# Patient Record
Sex: FEMALE | Race: WHITE | NOT HISPANIC OR LATINO | Employment: FULL TIME | URBAN - METROPOLITAN AREA
[De-identification: names, ages, dates, MRNs, and addresses within clinical notes are randomized per-mention and may not be internally consistent; named-entity substitution may affect disease eponyms.]

---

## 2017-01-18 ENCOUNTER — ALLSCRIPTS OFFICE VISIT (OUTPATIENT)
Dept: OTHER | Facility: OTHER | Age: 32
End: 2017-01-18

## 2017-03-22 ENCOUNTER — ALLSCRIPTS OFFICE VISIT (OUTPATIENT)
Dept: OTHER | Facility: OTHER | Age: 32
End: 2017-03-22

## 2018-01-13 VITALS
HEART RATE: 80 BPM | HEIGHT: 66 IN | DIASTOLIC BLOOD PRESSURE: 70 MMHG | WEIGHT: 197.45 LBS | TEMPERATURE: 98.4 F | SYSTOLIC BLOOD PRESSURE: 128 MMHG | BODY MASS INDEX: 31.73 KG/M2

## 2018-01-14 VITALS
HEIGHT: 66 IN | TEMPERATURE: 98.5 F | SYSTOLIC BLOOD PRESSURE: 124 MMHG | DIASTOLIC BLOOD PRESSURE: 76 MMHG | BODY MASS INDEX: 28.32 KG/M2 | WEIGHT: 176.2 LBS | HEART RATE: 82 BPM

## 2018-01-16 NOTE — RESULT NOTES
Verified Results  (LC) EBV Ab VCA, IgG [id number] 11:47AM Erick Griffins     Test Name Result Flag Reference   EBV Ab VCA, IgG >600 0 U/mL H 0 0-17 9   Negative        <18 0                                                  Equivocal 18 0 - 21 9                                                  Positive        >21 9     (LC) EBV Ab VCA, IgM [id number] 11:47AM Erick Griffins     Test Name Result Flag Reference   EBV Ab VCA, IgM <36 0 U/mL  0 0-35 9   Negative        <36 0                                                  Equivocal 36 0 - 43 9                                                  Positive        >43 9       Discussion/Summary    Mono test indicates she had the disease sometime in the past  How is she doing on the antibiotic? Please give me an update    Patient aware1       1 Amended By: Ayanna Glynn; May 20 2016 12:29 PM EST

## 2018-01-18 NOTE — RESULT NOTES
Verified Results  (1) CBC/PLT/DIFF [id number] 11:47AM Krystle Rothman     Test Name Result Flag Reference   WBC 13 2 x10E3/uL H 3 4-10 8   RBC 4 05 x10E6/uL  3 77-5 28   Hemoglobin 12 8 g/dL  11 1-15 9   Hematocrit 38 2 %  34 0-46  6   MCV 94 fL  79-97   MCH 31 6 pg  26 6-33 0   MCHC 33 5 g/dL  31 5-35 7   RDW 14 1 %  12 3-15 4   Platelets 469 N48T8/EO  150-379   Neutrophils 57 %     Lymphs 32 %     Monocytes 9 %     Eos 0 %     Basos 1 %     Neutrophils (Absolute) 7 6 x10E3/uL H 1 4-7 0   Lymphs (Absolute) 4 2 x10E3/uL H 0 7-3 1   Monocytes(Absolute) 1 1 x10E3/uL H 0 1-0 9   Eos (Absolute) 0 0 x10E3/uL  0 0-0 4   Baso (Absolute) 0 1 x10E3/uL  0 0-0 2   Immature Granulocytes 1 %     Immature Grans (Abs) 0 1 x10E3/uL  0 0-0 1       Plan  Acute tonsillitis    · Azithromycin 250 MG Oral Tablet; Take two tablets today, then one daily until  done    Discussion/Summary   Mono test is still pending but her white blood cell count is elevated and I do think an Rx Azithromycin would help  eRx done  We will let her know about mono when result comes in

## 2022-05-23 ENCOUNTER — OFFICE VISIT (OUTPATIENT)
Dept: FAMILY MEDICINE CLINIC | Facility: HOSPITAL | Age: 37
End: 2022-05-23

## 2022-05-23 VITALS
BODY MASS INDEX: 25.95 KG/M2 | SYSTOLIC BLOOD PRESSURE: 132 MMHG | HEART RATE: 66 BPM | DIASTOLIC BLOOD PRESSURE: 78 MMHG | TEMPERATURE: 98.1 F | WEIGHT: 160.8 LBS

## 2022-05-23 DIAGNOSIS — Z32.01 POSITIVE URINE PREGNANCY TEST: ICD-10-CM

## 2022-05-23 DIAGNOSIS — N91.2 AMENORRHEA: Primary | ICD-10-CM

## 2022-05-23 PROCEDURE — 99203 OFFICE O/P NEW LOW 30 MIN: CPT | Performed by: NURSE PRACTITIONER

## 2022-05-23 NOTE — PROGRESS NOTES
Assessment/Plan:      With positive urine pregnancy test  Check HCG to confirm  Quantitative  Should eval possible miscarriage with new onset vaginal bleeding after positive preg test       Diagnoses and all orders for this visit:    Amenorrhea  -     hCG, quantitative; Future  -     hCG, quantitative    Positive urine pregnancy test          Subjective:     Patient ID: Johana Mcneill is a 43 y o  female  Here to re-establish care  [facility]  [facility] who recommended Hcg testing through PCP  Currently has yeast infection which she gets frequently with hormonal changes  Over the last week has had light bleeding  Had intercourse last night bleeding became heavier  LMP was 2/15/22  Had IUD out in January  Periods were irregular  Review of Systems   Genitourinary: Positive for menstrual problem, vaginal bleeding and vaginal discharge  The following portions of the patient's history were reviewed and updated as appropriate: allergies, current medications, past family history, past medical history, past social history, past surgical history and problem list     Objective:  Vitals:    05/23/22 1512   BP: 132/78   Pulse: 66   Temp: 98 1 °F (36 7 °C)      Physical Exam  Vitals reviewed  Constitutional:       Appearance: Normal appearance  Cardiovascular:      Rate and Rhythm: Normal rate and regular rhythm  Heart sounds: Normal heart sounds  No murmur heard  Pulmonary:      Effort: Pulmonary effort is normal       Breath sounds: Normal breath sounds  Skin:     General: Skin is warm and dry  Neurological:      Mental Status: She is alert and oriented to person, place, and time  Psychiatric:         Mood and Affect: Mood normal          Behavior: Behavior normal          Thought Content:  Thought content normal          Judgment: Judgment normal

## 2022-05-28 LAB — HCG INTACT+B SERPL-ACNC: 2323 MIU/ML

## 2022-05-30 ENCOUNTER — HOSPITAL ENCOUNTER (EMERGENCY)
Facility: HOSPITAL | Age: 37
Discharge: HOME | End: 2022-05-31
Attending: EMERGENCY MEDICINE

## 2022-05-30 ENCOUNTER — NURSE TRIAGE (OUTPATIENT)
Dept: OTHER | Facility: OTHER | Age: 37
End: 2022-05-30

## 2022-05-30 DIAGNOSIS — O20.0 THREATENED MISCARRIAGE: Primary | ICD-10-CM

## 2022-05-30 LAB
ABO GROUP BLD: NORMAL
ABO GROUP BLD: NORMAL
ANION GAP SERPL CALCULATED.3IONS-SCNC: 6 MMOL/L (ref 4–13)
B-HCG SERPL-ACNC: 1077 MIU/ML
BACTERIA UR QL AUTO: ABNORMAL /HPF
BASOPHILS # BLD AUTO: 0.08 THOUSANDS/ΜL (ref 0–0.1)
BASOPHILS NFR BLD AUTO: 1 % (ref 0–1)
BILIRUB UR QL STRIP: NEGATIVE
BLD GP AB SCN SERPL QL: NEGATIVE
BUN SERPL-MCNC: 12 MG/DL (ref 5–25)
CALCIUM SERPL-MCNC: 8.3 MG/DL (ref 8.3–10.1)
CHLORIDE SERPL-SCNC: 103 MMOL/L (ref 100–108)
CLARITY UR: CLEAR
CO2 SERPL-SCNC: 29 MMOL/L (ref 21–32)
COLOR UR: YELLOW
CREAT SERPL-MCNC: 0.55 MG/DL (ref 0.6–1.3)
EOSINOPHIL # BLD AUTO: 0.21 THOUSAND/ΜL (ref 0–0.61)
EOSINOPHIL NFR BLD AUTO: 3 % (ref 0–6)
ERYTHROCYTE [DISTWIDTH] IN BLOOD BY AUTOMATED COUNT: 11.9 % (ref 11.6–15.1)
GFR SERPL CREATININE-BSD FRML MDRD: 119 ML/MIN/1.73SQ M
GLUCOSE SERPL-MCNC: 91 MG/DL (ref 65–140)
GLUCOSE UR STRIP-MCNC: NEGATIVE MG/DL
HCT VFR BLD AUTO: 33.9 % (ref 34.8–46.1)
HGB BLD-MCNC: 11.4 G/DL (ref 11.5–15.4)
HGB UR QL STRIP.AUTO: ABNORMAL
IMM GRANULOCYTES # BLD AUTO: 0.05 THOUSAND/UL (ref 0–0.2)
IMM GRANULOCYTES NFR BLD AUTO: 1 % (ref 0–2)
KETONES UR STRIP-MCNC: NEGATIVE MG/DL
LEUKOCYTE ESTERASE UR QL STRIP: NEGATIVE
LYMPHOCYTES # BLD AUTO: 2.63 THOUSANDS/ΜL (ref 0.6–4.47)
LYMPHOCYTES NFR BLD AUTO: 33 % (ref 14–44)
MCH RBC QN AUTO: 32.6 PG (ref 26.8–34.3)
MCHC RBC AUTO-ENTMCNC: 33.6 G/DL (ref 31.4–37.4)
MCV RBC AUTO: 97 FL (ref 82–98)
MONOCYTES # BLD AUTO: 0.65 THOUSAND/ΜL (ref 0.17–1.22)
MONOCYTES NFR BLD AUTO: 8 % (ref 4–12)
NEUTROPHILS # BLD AUTO: 4.3 THOUSANDS/ΜL (ref 1.85–7.62)
NEUTS SEG NFR BLD AUTO: 54 % (ref 43–75)
NITRITE UR QL STRIP: NEGATIVE
NON-SQ EPI CELLS URNS QL MICRO: ABNORMAL /HPF
NRBC BLD AUTO-RTO: 0 /100 WBCS
PH UR STRIP.AUTO: 5.5 [PH] (ref 4.5–8)
PLATELET # BLD AUTO: 262 THOUSANDS/UL (ref 149–390)
PMV BLD AUTO: 9.3 FL (ref 8.9–12.7)
POTASSIUM SERPL-SCNC: 3.9 MMOL/L (ref 3.5–5.3)
PROT UR STRIP-MCNC: ABNORMAL MG/DL
RBC # BLD AUTO: 3.5 MILLION/UL (ref 3.81–5.12)
RBC #/AREA URNS AUTO: ABNORMAL /HPF
RH BLD: POSITIVE
RH BLD: POSITIVE
SODIUM SERPL-SCNC: 138 MMOL/L (ref 136–145)
SP GR UR STRIP.AUTO: 1.02 (ref 1–1.03)
SPECIMEN EXPIRATION DATE: NORMAL
UROBILINOGEN UR QL STRIP.AUTO: 0.2 E.U./DL
WBC # BLD AUTO: 7.92 THOUSAND/UL (ref 4.31–10.16)
WBC #/AREA URNS AUTO: ABNORMAL /HPF

## 2022-05-30 PROCEDURE — 86850 RBC ANTIBODY SCREEN: CPT | Performed by: EMERGENCY MEDICINE

## 2022-05-30 PROCEDURE — 81001 URINALYSIS AUTO W/SCOPE: CPT

## 2022-05-30 PROCEDURE — 84702 CHORIONIC GONADOTROPIN TEST: CPT | Performed by: EMERGENCY MEDICINE

## 2022-05-30 PROCEDURE — 99284 EMERGENCY DEPT VISIT MOD MDM: CPT

## 2022-05-30 PROCEDURE — 85025 COMPLETE CBC W/AUTO DIFF WBC: CPT | Performed by: EMERGENCY MEDICINE

## 2022-05-30 PROCEDURE — 87086 URINE CULTURE/COLONY COUNT: CPT

## 2022-05-30 PROCEDURE — 86901 BLOOD TYPING SEROLOGIC RH(D): CPT | Performed by: EMERGENCY MEDICINE

## 2022-05-30 PROCEDURE — 86900 BLOOD TYPING SEROLOGIC ABO: CPT | Performed by: EMERGENCY MEDICINE

## 2022-05-30 PROCEDURE — 80048 BASIC METABOLIC PNL TOTAL CA: CPT | Performed by: EMERGENCY MEDICINE

## 2022-05-30 PROCEDURE — 99284 EMERGENCY DEPT VISIT MOD MDM: CPT | Performed by: EMERGENCY MEDICINE

## 2022-05-30 PROCEDURE — 36415 COLL VENOUS BLD VENIPUNCTURE: CPT | Performed by: EMERGENCY MEDICINE

## 2022-05-31 ENCOUNTER — DOCUMENTATION (OUTPATIENT)
Dept: EMERGENCY DEPT | Facility: HOSPITAL | Age: 37
End: 2022-05-31

## 2022-05-31 ENCOUNTER — APPOINTMENT (EMERGENCY)
Dept: ULTRASOUND IMAGING | Facility: HOSPITAL | Age: 37
End: 2022-05-31

## 2022-05-31 VITALS
HEART RATE: 74 BPM | DIASTOLIC BLOOD PRESSURE: 89 MMHG | SYSTOLIC BLOOD PRESSURE: 141 MMHG | TEMPERATURE: 97.8 F | OXYGEN SATURATION: 100 % | RESPIRATION RATE: 16 BRPM

## 2022-05-31 PROCEDURE — 76815 OB US LIMITED FETUS(S): CPT

## 2022-05-31 NOTE — DISCHARGE INSTRUCTIONS
Get repeat blood work in 48 hours  Follow up with your OB in the morning  Return to ER if pain worsens, or heavy vaginal bleeding

## 2022-05-31 NOTE — ED PROVIDER NOTES
History  Chief Complaint   Patient presents with    Threatened Miscarriage     Pt states she thinks she had a miscarriage  Pt states she thinks she is 4 to six weeks  Patient with pmh  complains of probable miscarriage this afternoon - she had stopped birth control 5 months ago and was trying to get pregnant  She had been having irregular periods  She checked a pregnancy test May 5 and it was positive  She had arranged to follow-up with [place] [facility]  Today, just resting at home and had sudden gush of blood and then a sac  She felt mild discomfort  Bleeding has decreased since then  None       History reviewed  No pertinent past medical history  Past Surgical History:   Procedure Laterality Date    SEPTOPLASTY         Family History   Problem Relation Age of Onset    No Known Problems Mother     Coronary artery disease Father     No Known Problems Sister     No Known Problems Brother     Breast cancer Maternal Grandmother     Coronary artery disease Maternal Grandmother      I have reviewed and agree with the history as documented  E-Cigarette/Vaping    E-Cigarette Use Never User      E-Cigarette/Vaping Substances     Social History     Tobacco Use    Smoking status: Never Smoker    Smokeless tobacco: Never Used   Vaping Use    Vaping Use: Never used   Substance Use Topics    Alcohol use: Not Currently    Drug use: Never       Review of Systems   Constitutional: Negative for chills and fever  HENT: Negative for rhinorrhea and sore throat  Respiratory: Negative for shortness of breath  Cardiovascular: Negative for chest pain  Gastrointestinal: Negative for constipation, diarrhea, nausea and vomiting  Genitourinary: Positive for pelvic pain and vaginal bleeding  Negative for dysuria and frequency  Skin: Negative for rash  All other systems reviewed and are negative  Physical Exam  Physical Exam  Vitals and nursing note reviewed   Exam conducted with a chaperone present  Constitutional:       Appearance: She is well-developed  HENT:      Head: Normocephalic and atraumatic  Right Ear: External ear normal       Left Ear: External ear normal       Nose: Nose normal    Eyes:      Conjunctiva/sclera: Conjunctivae normal       Pupils: Pupils are equal, round, and reactive to light  Cardiovascular:      Rate and Rhythm: Normal rate and regular rhythm  Heart sounds: Normal heart sounds  Pulmonary:      Effort: Pulmonary effort is normal  No respiratory distress  Breath sounds: Normal breath sounds  No wheezing  Abdominal:      General: Bowel sounds are normal  There is no distension  Palpations: Abdomen is soft  Tenderness: There is no abdominal tenderness  Genitourinary:     Exam position: Supine  Pubic Area: No rash  Labia:         Right: No rash  Vagina: Normal       Cervix: Dilated  Cervical bleeding present  No cervical motion tenderness or discharge  Uterus: Normal        Adnexa: Right adnexa normal and left adnexa normal         Right: No tenderness  Left: No tenderness  Musculoskeletal:         General: No deformity  Normal range of motion  Cervical back: Normal range of motion and neck supple  No spinous process tenderness  Skin:     General: Skin is warm and dry  Findings: No rash  Neurological:      General: No focal deficit present  Mental Status: She is alert  GCS: GCS eye subscore is 4  GCS verbal subscore is 5  GCS motor subscore is 6  Sensory: No sensory deficit     Psychiatric:         Mood and Affect: Mood normal          Vital Signs  ED Triage Vitals [05/30/22 2107]   Temperature Pulse Respirations Blood Pressure SpO2   97 8 °F (36 6 °C) 79 18 132/86 97 %      Temp Source Heart Rate Source Patient Position - Orthostatic VS BP Location FiO2 (%)   Temporal Monitor Lying Right arm --      Pain Score       --           Vitals:    05/30/22 2107 05/31/22 0000 05/31/22 0125 05/31/22 0200   BP: 132/86 137/77 136/83 141/89   Pulse: 79 78 68 74   Patient Position - Orthostatic VS: Lying            Visual Acuity      ED Medications  Medications - No data to display    Diagnostic Studies  Results Reviewed     Procedure Component Value Units Date/Time    Urine culture [[id number]] Collected: 05/30/22 2200    Lab Status: Final result Specimen: Urine, Clean Catch Updated: 06/01/22 0710     Urine Culture No Growth <1000 cfu/mL    hCG, quantitative [[id number]]  (Abnormal) Collected: 05/30/22 2156    Lab Status: Final result Specimen: Blood from Arm, Left Updated: 05/30/22 2258     HCG, Quant 1,077 mIU/mL     Narrative:       Expected Ranges:     Approximate               Approximate HCG  Gestation age          Concentration ( mIU/mL)  _____________          ______________________   Aleida John                      HCG values  0 2-1                       5-50  1-2                           2-3                         100-5000  3-4                         500-67719  4-5                         1000-97558  5-6                         [id number]  6-8                         80193-276350  8-12                        15302-793631      Urine Microscopic [[id number]]  (Abnormal) Collected: 05/30/22 2200    Lab Status: Final result Specimen: Urine, Clean Catch Updated: 05/30/22 2253     RBC, UA Innumerable /hpf      WBC, UA None Seen /hpf      Epithelial Cells Occasional /hpf      Bacteria, UA None Seen /hpf     Basic metabolic panel [[id number]]  (Abnormal) Collected: 05/30/22 2156    Lab Status: Final result Specimen: Blood from Arm, Left Updated: 05/30/22 2238     Sodium 138 mmol/L      Potassium 3 9 mmol/L      Chloride 103 mmol/L      CO2 29 mmol/L      ANION GAP 6 mmol/L      BUN 12 mg/dL      Creatinine 0 55 mg/dL      Glucose 91 mg/dL      Calcium 8 3 mg/dL      eGFR 119 ml/min/1 73sq m     Narrative:      [place] guidelines for Chronic Kidney Disease (CKD):   Stage 1 with normal or high GFR (GFR > 90 mL/min/1 73 square meters)    Stage 2 Mild CKD (GFR = 60-89 mL/min/1 73 square meters)    Stage 3A Moderate CKD (GFR = 45-59 mL/min/1 73 square meters)    Stage 3B Moderate CKD (GFR = 30-44 mL/min/1 73 square meters)    Stage 4 Severe CKD (GFR = 15-29 mL/min/1 73 square meters)    Stage 5 End Stage CKD (GFR <15 mL/min/1 73 square meters)  Note: GFR calculation is accurate only with a steady state creatinine    CBC and differential [[id number]]  (Abnormal) Collected: 05/30/22 2156    Lab Status: Final result Specimen: Blood from Arm, Left Updated: 05/30/22 2203     WBC 7 92 Thousand/uL      RBC 3 50 Million/uL      Hemoglobin 11 4 g/dL      Hematocrit 33 9 %      MCV 97 fL      MCH 32 6 pg      MCHC 33 6 g/dL      RDW 11 9 %      MPV 9 3 fL      Platelets 874 Thousands/uL      nRBC 0 /100 WBCs      Neutrophils Relative 54 %      Immat GRANS % 1 %      Lymphocytes Relative 33 %      Monocytes Relative 8 %      Eosinophils Relative 3 %      Basophils Relative 1 %      Neutrophils Absolute 4 30 Thousands/µL      Immature Grans Absolute 0 05 Thousand/uL      Lymphocytes Absolute 2 63 Thousands/µL      Monocytes Absolute 0 65 Thousand/µL      Eosinophils Absolute 0 21 Thousand/µL      Basophils Absolute 0 08 Thousands/µL     Urine Macroscopic, POC [[id number]]  (Abnormal) Collected: 05/30/22 2200    Lab Status: Final result Specimen: Urine Updated: 05/30/22 2202     Color, UA Yellow     Clarity, UA Clear     pH, UA 5 5     Leukocytes, UA Negative     Nitrite, UA Negative     Protein, UA 30 (1+) mg/dl      Glucose, UA Negative mg/dl      Ketones, UA Negative mg/dl      Urobilinogen, UA 0 2 E U /dl      Bilirubin, UA Negative     Blood, UA Large     Specific Gravity, UA 1 025                 US OB pregnancy limited with transvaginal   Final Result by Darline Chong DO (05/31 0147)   No intrauterine gestation or adnexal mass identified     Differential remains early IUP, spontaneous  and ectopic pregnancy  Correlate with serial quantitative BHCG  Left-sided corpus luteal cyst      Workstation performed: [id number]                    Procedures  POC Pelvic US    Date/Time: 2022 11:19 PM  Performed by: Jana Green DO  Authorized by: Jana Green DO     Patient location:  ED  Procedure details:     Exam Type:  Diagnostic    Indications: evaluate for IUP and pregnant with vaginal bleeding      Assessment for: confirm intrauterine pregnancy and evaluate fetal viability      Technique:  Transabdominal obstetric (HCG+) exam    Views obtained: uterus (transverse and sagittal)      Image quality: non-diagnostic      Image availability:  Not saved  Uterine findings:     Endometrial stripe: not identified      Intrauterine pregnancy: not identified      Gestational sac: not identified      Fetal heart rate: not identified    Other findings:     Free pelvic fluid: not identified    Interpretation:     Ultrasound impressions: indeterminate      Pregnancy findings: indeterminate               ED Course  ED Course as of 22e May 31, 2022   0013 Sign out to [employer] - probable miscarriage earlier today, ultrasound to rule out ectopic, follow-up with [place] [facility]                               SBIRT 22yo+    Flowsheet Row Most Recent Value   SBIRT (23 yo +)    In order to provide better care to our patients, we are screening all of our patients for alcohol and drug use  Would it be okay to ask you these screening questions? Yes Filed at: 2022   Initial Alcohol Screen: US AUDIT-C     1  How often do you have a drink containing alcohol? 0 Filed at: 2022   2  How many drinks containing alcohol do you have on a typical day you are drinking? 0 Filed at: 2022   3a  Male UNDER 65: How often do you have five or more drinks on one occasion? 0 Filed at: 2022   3b  FEMALE Any Age, or MALE 65+:  How often do you have 4 or more drinks on one occassion? 0 Filed at: 05/30/2022 2124   Audit-C Score 0 Filed at: 05/30/2022 2124   DAVID: How many times in the past year have you    Used an illegal drug or used a prescription medication for non-medical reasons? Never Filed at: 05/30/2022 2124                    [facility]  Number of Diagnoses or Management Options  Threatened miscarriage: new and requires workup     Amount and/or Complexity of Data Reviewed  Clinical lab tests: ordered and reviewed  Tests in the radiology section of CPT®: ordered and reviewed  Discuss the patient with other providers: yes    Patient Progress  Patient progress: improved      Disposition  Final diagnoses:   Threatened miscarriage     Time reflects when diagnosis was documented in both MDM as applicable and the Disposition within this note     Time User Action Codes Description Comment    5/31/2022  1:53 AM Mayra Raw Add [O20 0] Threatened miscarriage       ED Disposition     ED Disposition   Discharge    Condition   Stable    Date/Time   Tue May 31, 2022  1:53 AM    Comment   Delfin Hodges discharge to home/self care  Follow-up Information     Follow up With Specialties Details Why Contact Info [facility], [address] [place] [address] Will Nurse Practitioner Schedule an appointment as soon as possible for a visit   [facility]  [address]  [address] [address] [phone]        [address] Emergency Department Emergency Medicine  If symptoms worsen [address] [place], [id number]  [address] [facility] Emergency Department, [address] [place] [address] [address]          There are no discharge medications for this patient  Outpatient Discharge Orders   hCG, quantitative   Standing Status: Future Standing Exp   Date: 05/31/23       PDMP Review     None          ED Provider  Electronically Signed by           Rosangela Basurto DO  06/02/22 2345

## 2022-05-31 NOTE — TELEPHONE ENCOUNTER
Reason for Disposition   Passed tissue (e g , gray-white)    Answer Assessment - Initial Assessment Questions  1  ONSET: "When did this bleeding start?"        5/30 3 hours ago    2  DESCRIPTION: "Describe the bleeding that you are having " "How much bleeding is there?"     - SPOTTING: spotting, or pinkish / brownish mucous discharge; does not fill panti-liner or pad     - MILD:  less than 1 pad / hour; less than patient's usual menstrual bleeding    - MODERATE: 1-2 pads / hour; 1 menstrual cup every 6 hours; small-medium blood clots (e g , pea, grape, small coin)    - SEVERE: soaking 2 or more pads/hour for 2 or more hours; 1 menstrual cup every 2 hours; bleeding not contained by pads or continuous red blood from vagina; large blood clots (e g , golf ball, large coin)       Initially a moderate amount of bleeding  Noted about 45 minutes ago large "sack" came out with a gush of blood  Overflowed maxi pad and ran down leg  Currently not bleeding  3  ABDOMINAL PAIN SEVERITY: If present, ask: "How bad is it?"  (e g , Scale 1-10; mild, moderate, or severe)    - MILD (1-3): doesn't interfere with normal activities, abdomen soft and not tender to touch     - MODERATE (4-7): interferes with normal activities or awakens from sleep, tender to touch     - SEVERE (8-10): excruciating pain, doubled over, unable to do any normal activities      Denies    4  PREGNANCY: "Do you know how many weeks or months pregnant you are?" "When was the first day of your last normal menstrual period?"      Yes, unknown  Urine test positive 5/10     5  HEMODYNAMIC STATUS: "Are you weak or feeling lightheaded?" If Yes, ask: "Can you stand and walk normally?"       Denies    6  OTHER SYMPTOMS: "What other symptoms are you having with the bleeding?" (e g , passed tissue, vaginal discharge, fever, menstrual-type cramps)      Passed tissue x1  Denies fever and cramps at this time      Protocols used: PREGNANCY - VAGINAL BLEEDING LESS THAN 20 WEEKS EGA-ADULT-AH

## 2022-05-31 NOTE — ED CARE HANDOFF
Emergency Department Sign Out Note        Sign out and transfer of care from Dr Toya Solis  See Separate Emergency Department note  The patient, Yarelis Lockhart, was evaluated by the previous provider for vaginal bleeding  Workup Completed:  US OB pregnancy limited with transvaginal   Final Result   No intrauterine gestation or adnexal mass identified  Differential remains early IUP, spontaneous  and ectopic pregnancy  Correlate with serial quantitative BHCG        Left-sided corpus luteal cyst      Workstation performed: [id number]            Labs Reviewed   CBC AND DIFFERENTIAL - Abnormal       Result Value Ref Range Status    WBC 7 92  4 31 - 10 16 Thousand/uL Final    RBC 3 50 (*) 3 81 - 5 12 Million/uL Final    Hemoglobin 11 4 (*) 11 5 - 15 4 g/dL Final    Hematocrit 33 9 (*) 34 8 - 46 1 % Final    MCV 97  82 - 98 fL Final    MCH 32 6  26 8 - 34 3 pg Final    MCHC 33 6  31 4 - 37 4 g/dL Final    RDW 11 9  11 6 - 15 1 % Final    MPV 9 3  8 9 - 12 7 fL Final    Platelets 600  315 - 390 Thousands/uL Final    nRBC 0  /100 WBCs Final    Neutrophils Relative 54  43 - 75 % Final    Immat GRANS % 1  0 - 2 % Final    Lymphocytes Relative 33  14 - 44 % Final    Monocytes Relative 8  4 - 12 % Final    Eosinophils Relative 3  0 - 6 % Final    Basophils Relative 1  0 - 1 % Final    Neutrophils Absolute 4 30  1 85 - 7 62 Thousands/µL Final    Immature Grans Absolute 0 05  0 00 - 0 20 Thousand/uL Final    Lymphocytes Absolute 2 63  0 60 - 4 47 Thousands/µL Final    Monocytes Absolute 0 65  0 17 - 1 22 Thousand/µL Final    Eosinophils Absolute 0 21  0 00 - 0 61 Thousand/µL Final    Basophils Absolute 0 08  0 00 - 0 10 Thousands/µL Final   BASIC METABOLIC PANEL - Abnormal    Sodium 138  136 - 145 mmol/L Final    Potassium 3 9  3 5 - 5 3 mmol/L Final    Chloride 103  100 - 108 mmol/L Final    CO2 29  21 - 32 mmol/L Final    ANION GAP 6  4 - 13 mmol/L Final    BUN 12  5 - 25 mg/dL Final    Creatinine 0 55 (*) 0 60 - 1 30 mg/dL Final    Comment: Standardized to IDMS reference method    Glucose 91  65 - 140 mg/dL Final    Comment: If the patient is fasting, the ADA then defines impaired fasting glucose as > 100 mg/dL and diabetes as > or equal to 123 mg/dL  Specimen collection should occur prior to Sulfasalazine administration due to the potential for falsely depressed results  Specimen collection should occur prior to Sulfapyridine administration due to the potential for falsely elevated results      Calcium 8 3  8 3 - 10 1 mg/dL Final    eGFR 119  ml/min/1 73sq m Final    Narrative:     [place] guidelines for Chronic Kidney Disease (CKD):     Stage 1 with normal or high GFR (GFR > 90 mL/min/1 73 square meters)    Stage 2 Mild CKD (GFR = 60-89 mL/min/1 73 square meters)    Stage 3A Moderate CKD (GFR = 45-59 mL/min/1 73 square meters)    Stage 3B Moderate CKD (GFR = 30-44 mL/min/1 73 square meters)    Stage 4 Severe CKD (GFR = 15-29 mL/min/1 73 square meters)    Stage 5 End Stage CKD (GFR <15 mL/min/1 73 square meters)  Note: GFR calculation is accurate only with a steady state creatinine   HCG, QUANTITATIVE - Abnormal    HCG, Quant 1,077 (*) <=6 mIU/mL Final    Comment: Verified by Repeat Analysis    Narrative:      Expected Ranges:     Approximate               Approximate HCG  Gestation age          Concentration ( mIU/mL)  _____________          ______________________   Kvng Gulling                      HCG values  0 2-1                       5-50  1-2                           2-3                         100-5000  3-4                         500-02583  4-5                         1000-13722  5-6                         [id number]  6-8                         [id number]  8-12                        [id number]     URINE MICROSCOPIC - Abnormal    RBC, UA Innumerable (*) None Seen, 0-1, 1-2, 2-4, 0-5 /hpf Final    WBC, UA None Seen  None Seen, 0-1, 1-2, 0-5, 2-4 /hpf Final    Epithelial Cells Occasional  None Seen, Occasional /hpf Final    Bacteria, UA None Seen  None Seen, Occasional /hpf Final   URINE MACROSCOPIC, POC - Abnormal    Color, UA Yellow   Final    Clarity, UA Clear   Final    pH, UA 5 5  4 5 - 8 0 Final    Leukocytes, UA Negative  Negative Final    Nitrite, UA Negative  Negative Final    Protein, UA 30 (1+) (*) Negative mg/dl Final    Glucose, UA Negative  Negative mg/dl Final    Ketones, UA Negative  Negative mg/dl Final    Urobilinogen, UA 0 2  0 2, 1 0 E U /dl E U /dl Final    Bilirubin, UA Negative  Negative Final    Blood, UA Large (*) Negative Final    Specific Gravity, UA 1 025  1 003 - 1 030 Final   URINE CULTURE   POCT URINALYSIS DIPSTICK   TYPE AND SCREEN    ABO Grouping A   Final    Rh Factor Positive   Final    Antibody Screen Negative   Final    Specimen Expiration Date [id number]   Final   ABORH RECHECK    ABO Grouping A   Final    Rh Factor Positive   Final        ED Course / Workup Pending (followup):                                    ED Course as of 05/31/22 0155   Mon May 30, 2022   2356 S/o from Dr Aylin Cruz  Likely miscarriage (passed tissue)  U/S pend to r/o ectopic  Follows with OB at [facility]  Pelvic small blood  Os is open  Tue May 31, 2022   0153 U/S noted  Will have her f/u with OB  Strict RTED instructions, and 48 hour HCG        Procedures  MDM  Number of Diagnoses or Management Options  Threatened miscarriage: new and requires workup     Amount and/or Complexity of Data Reviewed  Clinical lab tests: reviewed  Tests in the radiology section of CPT®: reviewed  Tests in the medicine section of CPT®: reviewed  Discussion of test results with the performing providers: yes  Review and summarize past medical records: yes    Risk of Complications, Morbidity, and/or Mortality  Presenting problems: moderate  Diagnostic procedures: low  Management options: low    Patient Progress  Patient progress: stable          Disposition  Final diagnoses:   Threatened miscarriage Time reflects when diagnosis was documented in both MDM as applicable and the Disposition within this note     Time User Action Codes Description Comment    5/31/2022  1:53 AM Nancy Adjutant Add [O20 0] Threatened miscarriage       ED Disposition     ED Disposition   Discharge    Condition   Stable    Date/Time   Tue May 31, 2022  1:53 AM    Comment   Marline Valverdecoyjoe discharge to home/self care  Follow-up Information     Follow up With Specialties Details Why Contact Info Additional [facility], [address], Nurse Practitioner Schedule an appointment as soon as possible for a visit   Alejandra  [address] [facility]  [address] [facility] [phone]        [address] Emergency Department Emergency Medicine  If symptoms worsen [address] [place], [id number]  [address] [facility] Emergency Department, [address], [facility], [facility] [address]        Patient's Medications    No medications on file     Outpatient Discharge Orders   hCG, quantitative   Standing Status: Future Standing Exp   Date: 05/31/23          ED Provider  Electronically Signed by     Lashawn Nguyen MD  05/31/22 7407

## 2022-05-31 NOTE — TELEPHONE ENCOUNTER
Regarding: possible miscarriage   ----- Message from Alondra Rosa MA sent at 5/30/2022  8:02 PM EDT -----  "I was waiting to get my results tomorrow to see how far along I am  This morning I started bleeding, and then about an hour ago it was excessive bleeding, and large clot fell out that I feel was the fetus   I am not sure what I am supposed to do"

## 2022-05-31 NOTE — ED NOTES
Pt reports mild cramping for a few hours earlier tonight  At hour 3 of the cramping, she noted blood amount increased and noted a "sac" come out        Andrew Figueroa, JEAN MARIE  05/30/22 2127

## 2022-06-01 LAB — BACTERIA UR CULT: NORMAL

## 2022-06-03 LAB — HCG INTACT+B SERPL-ACNC: 164 MIU/ML

## 2023-04-27 ENCOUNTER — TELEPHONE (OUTPATIENT)
Dept: FAMILY MEDICINE CLINIC | Facility: HOSPITAL | Age: 38
End: 2023-04-27

## 2023-04-27 NOTE — TELEPHONE ENCOUNTER
Pt states a physical is needed before end of Feb 2024  Scheduled for 2/27, made aware 4/18/23 appt was physical and 2/2024 would be before the year is up  Pt scheduled for 2/27/24

## 2023-05-22 ENCOUNTER — OFFICE VISIT (OUTPATIENT)
Dept: OBGYN CLINIC | Facility: CLINIC | Age: 38
End: 2023-05-22

## 2023-05-22 VITALS
HEIGHT: 66 IN | BODY MASS INDEX: 25.68 KG/M2 | SYSTOLIC BLOOD PRESSURE: 134 MMHG | DIASTOLIC BLOOD PRESSURE: 78 MMHG | WEIGHT: 159.8 LBS

## 2023-05-22 DIAGNOSIS — Z30.09 CONTRACEPTIVE EDUCATION: ICD-10-CM

## 2023-05-22 DIAGNOSIS — Z12.31 ENCOUNTER FOR SCREENING MAMMOGRAM FOR MALIGNANT NEOPLASM OF BREAST: ICD-10-CM

## 2023-05-22 DIAGNOSIS — Z01.419 ENCOUNTER FOR GYNECOLOGICAL EXAMINATION WITHOUT ABNORMAL FINDING: Primary | ICD-10-CM

## 2023-05-22 DIAGNOSIS — Z11.3 SCREEN FOR STD (SEXUALLY TRANSMITTED DISEASE): ICD-10-CM

## 2023-05-22 DIAGNOSIS — Z12.4 SCREENING FOR CERVICAL CANCER: ICD-10-CM

## 2023-05-22 RX ORDER — LEVONORGESTREL AND ETHINYL ESTRADIOL 0.1-0.02MG
1 KIT ORAL DAILY
Qty: 90 TABLET | Refills: 4 | Status: SHIPPED | OUTPATIENT
Start: 2023-05-22
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
## 2023-05-27 LAB
C TRACH RRNA CVX QL NAA+PROBE: NEGATIVE
CYTOLOGIST CVX/VAG CYTO: NORMAL
DX ICD CODE: NORMAL
HPV GENOTYPE REFLEX: NORMAL
HPV I/H RISK 4 DNA CVX QL PROBE+SIG AMP: NEGATIVE
N GONORRHOEA RRNA CVX QL NAA+PROBE: NEGATIVE
OTHER STN SPEC: NORMAL
PATH REPORT.FINAL DX SPEC: NORMAL
SL AMB NOTE:: NORMAL
SL AMB SPECIMEN ADEQUACY: NORMAL
SL AMB TEST METHODOLOGY: NORMAL

## 2023-05-31 ENCOUNTER — CONSULT (OUTPATIENT)
Dept: PLASTIC SURGERY | Facility: CLINIC | Age: 38
End: 2023-05-31

## 2023-05-31 VITALS
BODY MASS INDEX: 24.91 KG/M2 | HEIGHT: 66 IN | TEMPERATURE: 98.7 F | SYSTOLIC BLOOD PRESSURE: 129 MMHG | DIASTOLIC BLOOD PRESSURE: 89 MMHG | HEART RATE: 71 BPM | WEIGHT: 155 LBS

## 2023-05-31 DIAGNOSIS — R22.0 MASS OF SKIN OF HEAD: Primary | ICD-10-CM

## 2023-05-31 NOTE — PROGRESS NOTES
"Assessment/Plan:    Patient is a 25-year-old female referred by her PCP for evaluation of a soft tissue mass of her left temporal scalp  Please see HPI  I discussed surgical excision with complex closure  Patient understood and agreed  This will be done under general anesthesia  Discussed options, including forgoing surgery, as well as benefits and risks of surgery including but not limited to anesthesia, bleeding, infection, scarring and potential need for additional procedures  Consent was obtained and all questions answered to their satisfaction  We will plan for surgery at their earliest convenience  No problem-specific Assessment & Plan notes found for this encounter  Diagnoses and all orders for this visit:    Mass of skin of head          Subjective:      Patient ID: Demetrio Garcia is a 42 y o  female  HPI     Patient reports that she has a \"lump\" on the left side of her scalp that has been growing steadily for the past 5 years  It is nonpainful  She has not had any exudate  Upon evaluation, the patient has a round, raised soft tissue mass of her left temporal scalp measuring approx 2cm x 2cm  Mildly fluctuant  No central comedone  The following portions of the patient's history were reviewed and updated as appropriate: She  has a past medical history of Vaginitis  She   Patient Active Problem List    Diagnosis Date Noted   • Mass of skin of head 05/31/2023   • Encounter for screening mammogram for malignant neoplasm of breast 05/22/2023   • Contraceptive education 05/22/2023   • Screening for cervical cancer 05/22/2023   • Screen for STD (sexually transmitted disease) 05/22/2023     She  has a past surgical history that includes Septoplasty  Her family history includes Breast cancer in her maternal aunt and maternal grandmother; Coronary artery disease in her father and maternal grandmother; Hypertension in her mother; No Known Problems in her brother and sister    She  " reports that she has never smoked  She has never used smokeless tobacco  She reports current alcohol use  She reports that she does not use drugs  Current Outpatient Medications   Medication Sig Dispense Refill   • levonorgestrel-ethinyl estradiol (Aviane) 0 1-20 MG-MCG per tablet Take 1 tablet by mouth daily 90 tablet 4     No current facility-administered medications for this visit  Current Outpatient Medications on File Prior to Visit   Medication Sig   • levonorgestrel-ethinyl estradiol (Aviane) 0 1-20 MG-MCG per tablet Take 1 tablet by mouth daily     No current facility-administered medications on file prior to visit  She has No Known Allergies       Review of Systems    12 point review of systems was completed and is negative except as per HPI    Objective:      LMP  (LMP Unknown)          Physical Exam  Vitals and nursing note reviewed  Constitutional:       General: She is not in acute distress  Appearance: Normal appearance  She is normal weight  She is not ill-appearing  HENT:      Head: Normocephalic and atraumatic  Comments: See HPI     Nose: Nose normal       Mouth/Throat:      Mouth: Mucous membranes are moist    Eyes:      Extraocular Movements: Extraocular movements intact  Conjunctiva/sclera: Conjunctivae normal       Pupils: Pupils are equal, round, and reactive to light  Neck:      Vascular: No carotid bruit  Cardiovascular:      Rate and Rhythm: Normal rate and regular rhythm  Pulses: Normal pulses  Heart sounds: Normal heart sounds  Pulmonary:      Effort: Pulmonary effort is normal  No respiratory distress  Breath sounds: Normal breath sounds  Abdominal:      General: Abdomen is flat  Palpations: Abdomen is soft  Musculoskeletal:         General: No swelling, tenderness, deformity or signs of injury  Normal range of motion  Cervical back: Normal range of motion and neck supple  No rigidity or tenderness     Lymphadenopathy: Cervical: No cervical adenopathy  Skin:     General: Skin is warm and dry  Comments: See HPI   Neurological:      General: No focal deficit present  Mental Status: She is alert and oriented to person, place, and time  Cranial Nerves: No cranial nerve deficit  Sensory: No sensory deficit  Motor: No weakness     Psychiatric:         Mood and Affect: Mood normal          Behavior: Behavior normal

## 2023-06-07 ENCOUNTER — PREP FOR PROCEDURE (OUTPATIENT)
Dept: PLASTIC SURGERY | Facility: CLINIC | Age: 38
End: 2023-06-07

## 2023-06-07 DIAGNOSIS — Z01.812 ENCOUNTER FOR PRE-OPERATIVE LABORATORY TESTING: ICD-10-CM

## 2023-06-07 DIAGNOSIS — R22.0 MASS OF SCALP: Primary | ICD-10-CM

## 2023-07-21 PROBLEM — Z11.3 SCREEN FOR STD (SEXUALLY TRANSMITTED DISEASE): Status: RESOLVED | Noted: 2023-05-22 | Resolved: 2023-07-21

## 2023-07-21 PROBLEM — Z12.4 SCREENING FOR CERVICAL CANCER: Status: RESOLVED | Noted: 2023-05-22 | Resolved: 2023-07-21

## 2023-07-27 RX ORDER — MULTIVIT WITH MINERALS/LUTEIN
1000 TABLET ORAL DAILY
COMMUNITY

## 2023-07-27 RX ORDER — CALCIUM ACETATE 667 MG/1
667 CAPSULE ORAL
COMMUNITY

## 2023-07-27 RX ORDER — MAGNESIUM 30 MG
30 TABLET ORAL 2 TIMES DAILY
COMMUNITY

## 2023-07-27 NOTE — PRE-PROCEDURE INSTRUCTIONS
Pre-Surgery Instructions:   Medication Instructions   • Ascorbic Acid (vitamin C) 1000 MG tablet Stop taking 7 days prior to surgery. • calcium acetate (PHOSLO) capsule Stop taking 7 days prior to surgery. • levonorgestrel-ethinyl estradiol (Aviane) 0.1-20 MG-MCG per tablet Take day of surgery. • magnesium 30 MG tablet Stop taking 7 days prior to surgery. Pt verbalizes understanding of the following:    - Bathing instructions, will use dial  - No lotions, powders, sprays, deodorant, jewelry, body piercings, false lashes or make-up  - No shaving within 24hrs    - DO NOT EAT OR DRINK ANYTHING after midnight on the evening before your procedure including coffee, tea, gum or hard candy.    - ONLY SIPS OF WATER with your medications are allowed on the morning of your procedure. - Avoid OTC non-directed Vit/ Suppl/ Herbals 7 days prior to surgery to ensure no drug interactions with perioperative surgical/ anesthetic meds  - Avoid NSAIDs 3 days prior  - Avoid ASA containing products 5 days prior  - Bring a list of meds you take at home with your last dose noted      - Arrange for someone to drive you home after the procedure & stay with you until the next morning  - Bring insurance cards & photo id    - Leave all valuables such as credit cards, money & jewelry at home    - Notify surgeon if you develop any cold symptoms, change in your health history or develop a rash/ open wounds     - Did the surgeon's office give you any other special instructions? No  - Did surgeon require any clearances?  No

## 2023-07-28 LAB
BASOPHILS # BLD AUTO: 0.1 X10E3/UL (ref 0–0.2)
BASOPHILS NFR BLD AUTO: 1 %
BUN SERPL-MCNC: 11 MG/DL (ref 6–24)
BUN/CREAT SERPL: 20 (ref 9–23)
CALCIUM SERPL-MCNC: 9.3 MG/DL (ref 8.7–10.2)
CHLORIDE SERPL-SCNC: 100 MMOL/L (ref 96–106)
CO2 SERPL-SCNC: 22 MMOL/L (ref 20–29)
CREAT SERPL-MCNC: 0.56 MG/DL (ref 0.57–1)
EGFR: 118 ML/MIN/1.73
EOSINOPHIL # BLD AUTO: 0.1 X10E3/UL (ref 0–0.4)
EOSINOPHIL NFR BLD AUTO: 1 %
ERYTHROCYTE [DISTWIDTH] IN BLOOD BY AUTOMATED COUNT: 12 % (ref 11.7–15.4)
GLUCOSE SERPL-MCNC: 81 MG/DL (ref 70–99)
HCT VFR BLD AUTO: 37.2 % (ref 34–46.6)
HGB BLD-MCNC: 12.8 G/DL (ref 11.1–15.9)
IMM GRANULOCYTES # BLD: 0.1 X10E3/UL (ref 0–0.1)
IMM GRANULOCYTES NFR BLD: 1 %
LYMPHOCYTES # BLD AUTO: 2.2 X10E3/UL (ref 0.7–3.1)
LYMPHOCYTES NFR BLD AUTO: 26 %
MCH RBC QN AUTO: 32.8 PG (ref 26.6–33)
MCHC RBC AUTO-ENTMCNC: 34.4 G/DL (ref 31.5–35.7)
MCV RBC AUTO: 95 FL (ref 79–97)
MONOCYTES # BLD AUTO: 0.4 X10E3/UL (ref 0.1–0.9)
MONOCYTES NFR BLD AUTO: 5 %
NEUTROPHILS # BLD AUTO: 5.8 X10E3/UL (ref 1.4–7)
NEUTROPHILS NFR BLD AUTO: 66 %
PLATELET # BLD AUTO: 350 X10E3/UL (ref 150–450)
POTASSIUM SERPL-SCNC: 4.9 MMOL/L (ref 3.5–5.2)
RBC # BLD AUTO: 3.9 X10E6/UL (ref 3.77–5.28)
SODIUM SERPL-SCNC: 138 MMOL/L (ref 134–144)
WBC # BLD AUTO: 8.7 X10E3/UL (ref 3.4–10.8)

## 2023-07-31 ENCOUNTER — ANESTHESIA EVENT (OUTPATIENT)
Dept: PERIOP | Facility: HOSPITAL | Age: 38
End: 2023-07-31
Payer: COMMERCIAL

## 2023-07-31 PROCEDURE — NC001 PR NO CHARGE: Performed by: PHYSICIAN ASSISTANT

## 2023-07-31 NOTE — H&P
H&P - Plastic Surgery   Raúl Canchola 34 y.o. female MRN: [id number]  Unit/Bed#:  Encounter: [id number]           Assessment:  Mass of scalp  Plan:  EXCISION OF MASS OF SKIN OF SCALP WITH COMPLEX CLOSURE (Head)        HPI:   Raúl Canchola is a 34y.o. year old female who presents with a "lump" on the left side of her scalp that has been growing steadily for the past 5 years. It is nonpainful. She has not had any exudate. REVIEW OF SYSTEMS    GENERAL/CONSTITUTIONAL: The patient denies fever, fatigue, weakness, weight gain or weight loss. HEAD, EYES, EARS, NOSE AND THROAT: Eyes - The patient denies pain, redness, loss of vision, double or blurred vision and denies wearing glasses. The patient denies ringing in the ears, nosebleeds sinusitis, post nasal drip. Also denies frequent sore throats, hoarseness, painful swallowing. CARDIOVASCULAR: The patient denies chest pain, irregular heartbeats, palpitations, shortness of breath, heart murmurs, high blood pressure, cramps in his legs with walking, pain in his feet or toes at night or varicose veins. RESPIRATORY: The patient denies chronic cough, wheezing or night sweats. GASTROINTESTINAL: The patient denies decreased appetite, nausea, vomiting, diarrhea, constipation, blood in the stools. GENITOURINARY: The patient denies difficult urination, pain or burning with urination, blood in the urine. MUSCULOSKELETAL: The patient denies arm, thigh or calf cramps. No joint or muscle pain. No muscle weakness or tenderness. No joint swelling, neck pain, back pain or major orthopedic injuries. SKIN AND BREASTS: see hpi The patient denies easy bruising, skin redness, skin rash, hives. NEUROLOGIC: The patient denies headache, dizziness, fainting, memory loss. PSYCHIATRIC: The patient denies depression anxiety.   ENDOCRINE: The patient denies intolerance to hot or cold temperature, flushing, fingernail changes, increased thirst, increased salt intake or decreased sexual desire. HEMATOLOGIC/LYMPHATIC: The patient denies anemia, bleeding tendency or clotting tendency. ALLERGIC/IMMUNOLOGIC: The patient denies rhinitis, asthma, skin sensitivity, latex allergies or sensitivity. Historical Information   Past Medical History:   Diagnosis Date   • Vaginitis      Past Surgical History:   Procedure Laterality Date   • SEPTOPLASTY       Social History   Social History     Substance and Sexual Activity   Alcohol Use Not Currently    Comment: rarely     Social History     Substance and Sexual Activity   Drug Use Never     Social History     Tobacco Use   Smoking Status Never   Smokeless Tobacco Never     Family History:   Family History   Problem Relation Age of Onset   • Hypertension Mother    • Coronary artery disease Father    • No Known Problems Sister    • No Known Problems Brother    • Breast cancer Maternal Grandmother    • Coronary artery disease Maternal Grandmother    • Breast cancer Maternal Aunt        Meds/Allergies   No current facility-administered medications for this encounter. Current Outpatient Medications:   •  Ascorbic Acid (vitamin C) 1000 MG tablet, Take 1,000 mg by mouth daily, Disp: , Rfl:   •  calcium acetate (PHOSLO) capsule, Take 667 mg by mouth 3 (three) times a day with meals, Disp: , Rfl:   •  levonorgestrel-ethinyl estradiol (Aviane) 0.1-20 MG-MCG per tablet, Take 1 tablet by mouth daily, Disp: 90 tablet, Rfl: 4  •  magnesium 30 MG tablet, Take 30 mg by mouth 2 (two) times a day, Disp: , Rfl:       Objective     Physical Exam  Vitals and nursing note reviewed. Constitutional:       General: She is not in acute distress. Appearance: Normal appearance. She is normal weight. She is not ill-appearing. HENT:      Head: Normocephalic and atraumatic. Comments: See HPI     Nose: Nose normal.      Mouth/Throat:      Mouth: Mucous membranes are moist.   Eyes:      Extraocular Movements: Extraocular movements intact. Conjunctiva/sclera: Conjunctivae normal.      Pupils: Pupils are equal, round, and reactive to light. Neck:      Vascular: No carotid bruit. Cardiovascular:      Rate and Rhythm: Normal rate and regular rhythm. Pulses: Normal pulses. Heart sounds: Normal heart sounds. Pulmonary:      Effort: Pulmonary effort is normal. No respiratory distress. Breath sounds: Normal breath sounds. Abdominal:      General: Abdomen is flat. Palpations: Abdomen is soft. Musculoskeletal:         General: No swelling, tenderness, deformity or signs of injury. Normal range of motion. Cervical back: Normal range of motion and neck supple. No rigidity or tenderness. Lymphadenopathy:      Cervical: No cervical adenopathy. Skin:     General: Skin is warm and dry. Comments: See HPI   Neurological:      General: No focal deficit present. Mental Status: She is alert and oriented to person, place, and time. Cranial Nerves: No cranial nerve deficit. Sensory: No sensory deficit. Motor: No weakness. Psychiatric:         Mood and Affect: Mood normal.         Behavior: Behavior normal.     Lab Results:   Lab Results   Component Value Date    WBC 8.7 07/27/2023    HGB 12.8 07/27/2023    HCT 37.2 07/27/2023    MCV 95 07/27/2023     07/27/2023          No results found for: "TISSUECULT", "WOUNDCULT"      Imaging Studies:   No results found.     EKG, Pathology, and Other Studies:   No results found for: "FINALDX"    No intake or output data in the 24 hours ending 07/31/23 5419    Invasive Devices     None                 VTE Prophylaxis: Sequential compression device (Venodyne)

## 2023-08-01 ENCOUNTER — HOSPITAL ENCOUNTER (OUTPATIENT)
Facility: HOSPITAL | Age: 38
Setting detail: OUTPATIENT SURGERY
Discharge: HOME | End: 2023-08-01
Attending: STUDENT IN AN ORGANIZED HEALTH CARE EDUCATION/TRAINING PROGRAM | Admitting: STUDENT IN AN ORGANIZED HEALTH CARE EDUCATION/TRAINING PROGRAM
Payer: COMMERCIAL

## 2023-08-01 ENCOUNTER — ANESTHESIA (OUTPATIENT)
Dept: PERIOP | Facility: HOSPITAL | Age: 38
End: 2023-08-01
Payer: COMMERCIAL

## 2023-08-01 VITALS
OXYGEN SATURATION: 98 % | WEIGHT: 162.6 LBS | TEMPERATURE: 99.2 F | DIASTOLIC BLOOD PRESSURE: 74 MMHG | RESPIRATION RATE: 18 BRPM | SYSTOLIC BLOOD PRESSURE: 120 MMHG | HEART RATE: 68 BPM | BODY MASS INDEX: 26.13 KG/M2 | HEIGHT: 66 IN

## 2023-08-01 DIAGNOSIS — R22.0 MASS OF SCALP: ICD-10-CM

## 2023-08-01 LAB
EXT PREGNANCY TEST URINE: NEGATIVE
EXT. CONTROL: NORMAL

## 2023-08-01 PROCEDURE — 11423 EXC H-F-NK-SP B9+MARG 2.1-3: CPT | Performed by: STUDENT IN AN ORGANIZED HEALTH CARE EDUCATION/TRAINING PROGRAM

## 2023-08-01 PROCEDURE — 88304 TISSUE EXAM BY PATHOLOGIST: CPT | Performed by: PATHOLOGY

## 2023-08-01 PROCEDURE — 81025 URINE PREGNANCY TEST: CPT | Performed by: STUDENT IN AN ORGANIZED HEALTH CARE EDUCATION/TRAINING PROGRAM

## 2023-08-01 PROCEDURE — 12032 INTMD RPR S/A/T/EXT 2.6-7.5: CPT | Performed by: STUDENT IN AN ORGANIZED HEALTH CARE EDUCATION/TRAINING PROGRAM

## 2023-08-01 RX ORDER — CEFAZOLIN SODIUM 1 G/50ML
1000 SOLUTION INTRAVENOUS ONCE
Status: DISCONTINUED | OUTPATIENT
Start: 2023-08-01 | End: 2023-08-02 | Stop reason: HOSPADM

## 2023-08-01 RX ORDER — LIDOCAINE HYDROCHLORIDE 10 MG/ML
0.5 INJECTION, SOLUTION EPIDURAL; INFILTRATION; INTRACAUDAL; PERINEURAL ONCE AS NEEDED
Status: DISCONTINUED | OUTPATIENT
Start: 2023-08-01 | End: 2023-08-02 | Stop reason: HOSPADM

## 2023-08-01 RX ORDER — GABAPENTIN 300 MG/1
300 CAPSULE ORAL ONCE
Status: COMPLETED | OUTPATIENT
Start: 2023-08-01 | End: 2023-08-01

## 2023-08-01 RX ORDER — ACETAMINOPHEN 325 MG/1
975 TABLET ORAL ONCE
Status: COMPLETED | OUTPATIENT
Start: 2023-08-01 | End: 2023-08-01

## 2023-08-01 RX ORDER — SODIUM CHLORIDE, SODIUM LACTATE, POTASSIUM CHLORIDE, CALCIUM CHLORIDE 600; 310; 30; 20 MG/100ML; MG/100ML; MG/100ML; MG/100ML
125 INJECTION, SOLUTION INTRAVENOUS CONTINUOUS
Status: DISCONTINUED | OUTPATIENT
Start: 2023-08-01 | End: 2023-08-02 | Stop reason: HOSPADM

## 2023-08-01 RX ORDER — LIDOCAINE HYDROCHLORIDE AND EPINEPHRINE 10; 10 MG/ML; UG/ML
INJECTION, SOLUTION INFILTRATION; PERINEURAL AS NEEDED
Status: DISCONTINUED | OUTPATIENT
Start: 2023-08-01 | End: 2023-08-01 | Stop reason: HOSPADM

## 2023-08-01 RX ADMIN — ACETAMINOPHEN 975 MG: 325 TABLET ORAL at 06:40

## 2023-08-01 RX ADMIN — SODIUM CHLORIDE, SODIUM LACTATE, POTASSIUM CHLORIDE, AND CALCIUM CHLORIDE 125 ML/HR: .6; .31; .03; .02 INJECTION, SOLUTION INTRAVENOUS at 06:41

## 2023-08-01 RX ADMIN — GABAPENTIN 300 MG: 300 CAPSULE ORAL at 06:40

## 2023-08-01 NOTE — DISCHARGE INSTR - AVS FIRST PAGE
Surgery Date: 8/1/2023                Patient: Erick Rudolph  Surgeon: Dr. Job Smallwood     Postoperative Instructions for Outpatient Surgery  Excision of Lesion/Mass     Dressings:  [] Skin glue was applied to your incision over absorbable sutures. You may feel small pieces of suture at the ends of your incision. [x] Incision is closed with nonabsorbable sutures. [] Remove dressing the first morning following your surgery and bathe as directed. [x] No dressings are required but you may cover the incision with band-aid or gauze for comfort. [x] Apply bacitracin or other antibiotic ointment to incision and cover with band-aid or gauze. [] Leave dressing in place until your follow up appointment. [] Other instructions:      Bathing:  [x] OK to shampoo 48hours after surgery. Allow soap and water to gently wash over the incision. No scrubbing. Gently pat dry and apply dressing as needed/instructed above.  [] Keep incision/dressing dry until your follow up appointment. [x] No submerging incision in bathtub, pool, hot tub and/or lake. Activity:  [x] No heavy lifting (> 10lbs). [x] No strenuous exercise.  [] Walking is permitted and encouraged. [] Strict sun avoidance/protection of incision site. [] Other instructions:      Medication:  [x] Resume preoperative medications. [x] Ok to use Tylenol for pain control. You may also use ibuprofen 48 hours after surgery. [] Finish all antibiotics as prescribed.  [] You may not drive until off your pain medications. [x] Apply ice to area as needed for pain. Do not place ice directly on skin. [] Other instructions: It is expected to have some bruising, swelling and mild oozing at the incision site and the surrounding area. If there is more than you expect or you suspect an infection, please call the office. Some patients may experience a low-grade fever after surgery. If it is above 100.4, please call the office.      If you do not have a postoperative office appointment scheduled, please call the office today and let the staff know Dr. Ender CHANCE needs to see you in about 14  days for suture removal.    Please call [phone] with any questions, concerns or changes.

## 2023-08-01 NOTE — ANESTHESIA PREPROCEDURE EVALUATION
Procedure:  EXCISION OF MASS OF SKIN OF SCALP WITH COMPLEX CLOSURE (Head)  Has ankle bracelet as on house arrest, has Dermal in Mid chest. Dr. Jorge Fonseca has spoken with pt. Plan has changed to Local instead of GA. Relevant Problems   No relevant active problems   Recent URI , residual cough     Physical Exam    Airway    Mallampati score: II  TM Distance: >3 FB  Neck ROM: full     Dental   No notable dental hx     Cardiovascular      Pulmonary  Breath sounds clear to auscultation,     Other Findings        Anesthesia Plan  ASA Score- 2     Anesthesia Type- general with ASA Monitors. Additional Monitors:   Airway Plan: LMA. Plan Factors-Exercise tolerance (METS): >4 METS. Chart reviewed. Patient is not a current smoker. Induction- intravenous. Postoperative Plan-     Informed Consent- Anesthetic plan and risks discussed with patient. I personally reviewed this patient with the CRNA. Discussed and agreed on the Anesthesia Plan with the CRNA. Zahra Strange

## 2023-08-01 NOTE — OP NOTE
OPERATIVE REPORT  PATIENT NAME: Trell Ruiz    :  1985  MRN: [id number]  Pt Location: [facility] OR ROOM 01    SURGERY DATE: 2023    Surgeon(s) and Role:     * Emily Hdez MD - Primary    Preop Diagnosis:  Mass of scalp [R22.0]    Post-Op Diagnosis Codes:     * Mass of scalp [R22.0]    Procedure(s):  1. Excision of pilar cyst of scalp (size 2.3x1.8 cm) with intermediate closure (total length 3.4 cm)    Specimen(s):  ID Type Source Tests Collected by Time Destination   1 : Pilar cyst- 2.3x1.8 Tissue Head TISSUE EXAM Armstrong MD Ketty 2023 1465        Estimated Blood Loss:   Minimal    Drains:  * No LDAs found *    Anesthesia Type:   Local    Operative Indications: Mass of scalp [R22.0]    Operative Findings:  Pilar cyst of scalp (size 2.3x1.8cm)  Intermediate closure length 3.4 cm  4 cc of 1% lidocaine with epi    Complications:   None    Procedure and Technique:  Patient was brought to the operating room, transferred to the operating table in supine fashion. A timeout was performed at which point all patient identifiers were deemed to be correct. The scalp was prepped and draped in the normal sterile fashion. 4 cc of 1% lidocaine with epi was used to locally infiltrate the area. After allowing for the epi to take effect, a 3.4 cm incision was made overlying the lesion. I dissected down through the skin and subcutaneous tissue encountering the pilar cyst. Circumferential dissection was performed around the cyst and resected intact. The operative field was irrigated and hemostasis was achieved with bipolar electrocautery. I then proceeded with intermediate layered closure. The dermis and subcutaneous tissue was closed with 3-0 vicryl and the skin with 3-0 prolene. Bacitracin was applied to the incision. This concluded the procedure. Patient tolerated the procedure well without complications. At the end of the case, all sponge, needle, and instrument counts were correct.  Patient was awakened from anesthesia and taken to the PACU in stable condition. I was present for the entire procedure, A qualified resident physician was not available and A physician assistant was required during the procedure for retraction, tissue handling, dissection and suturing       I was present for the entire procedure.     Patient Disposition:  PACU      SIGNATURE: Karol Tamayo MD  DATE: August 1, 2023  TIME: 7:58 AM

## 2023-08-03 ENCOUNTER — HOSPITAL ENCOUNTER (OUTPATIENT)
Dept: MAMMOGRAPHY | Facility: CLINIC | Age: 38
Discharge: HOME | End: 2023-08-03
Payer: COMMERCIAL

## 2023-08-03 VITALS — BODY MASS INDEX: 24.91 KG/M2 | HEIGHT: 66 IN | WEIGHT: 155 LBS

## 2023-08-03 DIAGNOSIS — Z12.31 ENCOUNTER FOR SCREENING MAMMOGRAM FOR MALIGNANT NEOPLASM OF BREAST: ICD-10-CM

## 2023-08-03 PROCEDURE — 77067 SCR MAMMO BI INCL CAD: CPT

## 2023-08-03 PROCEDURE — 77063 BREAST TOMOSYNTHESIS BI: CPT

## 2023-08-07 PROCEDURE — 88304 TISSUE EXAM BY PATHOLOGIST: CPT | Performed by: PATHOLOGY

## 2023-08-15 ENCOUNTER — OFFICE VISIT (OUTPATIENT)
Dept: PLASTIC SURGERY | Facility: CLINIC | Age: 38
End: 2023-08-15

## 2023-08-15 DIAGNOSIS — L72.11 PILAR CYST OF SCALP: Primary | ICD-10-CM

## 2023-08-15 NOTE — PROGRESS NOTES
[facility] Plastic and Reconstructive Surgery  [address] [place] [address] MAHAD Mckeon [place]  [phone]    Patient Identification: Jonnathan Padilla is a 34 y.o. female     History of Present Illness: The patient is a 34y.o.  year-old female  who presents to the office for post-op visit. Patient is 14 days s/p Excision Of Mass Of Skin Of Scalp With Complex Closure  on 8/1/2023 by Dr. Mary Cook.  The patient states she is feeling well at this time and has no concerns. She has no fevers, chills, signs of infection or pain at this time. Patient has no complaints at this time. Past Medical History:   Diagnosis Date   • Vaginitis         Patient Active Problem List   Diagnosis   • Encounter for screening mammogram for malignant neoplasm of breast   • Contraceptive education   • Mass of skin of head        Past Surgical History:   Procedure Laterality Date   • DE EXC TUMOR SOFT TISS FACE&SCALP SUBFASCIAL <2CM N/A 8/1/2023    Procedure: EXCISION OF MASS OF SKIN OF SCALP WITH COMPLEX CLOSURE;  Surgeon: Jalen Aguilar MD;  Location: [facility];  Service: Plastics   • SEPTOPLASTY          No Known Allergies     Current Outpatient Medications on File Prior to Visit   Medication Sig Dispense Refill   • Ascorbic Acid (vitamin C) 1000 MG tablet Take 1,000 mg by mouth daily     • calcium acetate (PHOSLO) capsule Take 667 mg by mouth 3 (three) times a day with meals     • levonorgestrel-ethinyl estradiol (Aviane) 0.1-20 MG-MCG per tablet Take 1 tablet by mouth daily 90 tablet 4   • magnesium 30 MG tablet Take 30 mg by mouth 2 (two) times a day       No current facility-administered medications on file prior to visit. Tobacco Use: Low Risk  (8/3/2023)    Patient History    • Smoking Tobacco Use: Never    • Smokeless Tobacco Use: Never    • Passive Exposure: Not on file          Review of Systems  Constitutional: Denies fevers, chills or pain  Skin: Denies any warmth, erythema, acute edema or mucopurulent drainage. Physical Exam   Scalp: Surgical incisions are clean, dry, and intact, sutures intact. Skin perfusion is intact. There are no signs of infection, obvious hematoma, seroma or wound dehiscence. Assessment and Plan:  The patient is an 34y.o.  year-old female who presents to the office for post-op visit. Patient is 14 days s/p Excision Of Mass Of Skin Of Scalp With Complex Closure  on 8/1/2023 by Dr. Emilia Schwartz    -At today's visit sutures were removed. Scabbing upon incision site was debrided. Pt was instructed to wash the area daily with soap and water.  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  -Patient is aware that cystic structures have potential for reoccurrence. -surgical site is healing appropriately at this time. Pt was encouraged to conduct daily massage for scar flattening  -The patient is to return in 1 month for scar check. Pt prefers to return as needed as she does not live close to the office location. She may call the office with any concerns. - The patient is to call the office with any questions or concerns. All of the patient's questions were answered at this time and they agree with the plan of care.       Ilda Ramos PA-C  [facility] Plastic and Reconstructive Surgery

## 2024-06-10 ENCOUNTER — TELEPHONE (OUTPATIENT)
Dept: FAMILY MEDICINE CLINIC | Facility: HOSPITAL | Age: 39
End: 2024-06-10

## 2024-06-12 DIAGNOSIS — Z30.09 CONTRACEPTIVE EDUCATION: ICD-10-CM

## 2024-06-13 RX ORDER — LEVONORGESTREL/ETHIN.ESTRADIOL 0.1-0.02MG
1 TABLET ORAL DAILY
Qty: 90 TABLET | Refills: 0 | Status: SHIPPED | OUTPATIENT
Start: 2024-06-13

## 2024-06-13 RX ORDER — TIMOLOL MALEATE 5 MG/ML
1 SOLUTION/ DROPS OPHTHALMIC DAILY
Qty: 84 TABLET | Refills: 3 | OUTPATIENT
Start: 2024-06-13

## 2024-09-01 DIAGNOSIS — Z30.09 CONTRACEPTIVE EDUCATION: ICD-10-CM

## 2024-09-06 RX ORDER — LEVONORGESTREL AND ETHINYL ESTRADIOL 0.1-0.02MG
1 KIT ORAL DAILY
Qty: 28 TABLET | Refills: 2 | Status: SHIPPED | OUTPATIENT
Start: 2024-09-06

## 2024-09-06 NOTE — TELEPHONE ENCOUNTER
Pt called and scheduled annual for 09/19. Pt needs courtesy refill. Pt made aware of message sent.

## (undated) DEVICE — INTENDED FOR TISSUE SEPARATION, AND OTHER PROCEDURES THAT REQUIRE A SHARP SURGICAL BLADE TO PUNCTURE OR CUT.: Brand: BARD-PARKER SAFETY BLADES SIZE 10, STERILE

## (undated) DEVICE — PACK PBDS PLASTIC HEAD AND NECK RF

## (undated) DEVICE — INTENDED FOR TISSUE SEPARATION, AND OTHER PROCEDURES THAT REQUIRE A SHARP SURGICAL BLADE TO PUNCTURE OR CUT.: Brand: BARD-PARKER SAFETY BLADES SIZE 15, STERILE

## (undated) DEVICE — ABDOMINAL PAD: Brand: DERMACEA

## (undated) DEVICE — GLOVE SRG BIOGEL 6.5

## (undated) DEVICE — PLUMEPEN PRO 10FT

## (undated) DEVICE — GLOVE INDICATOR PI UNDERGLOVE SZ 8 BLUE

## (undated) DEVICE — ANTIBACTERIAL UNDYED BRAIDED (POLYGLACTIN 910), SYNTHETIC ABSORBABLE SUTURE: Brand: COATED VICRYL

## (undated) DEVICE — PAD GROUNDING ADULT

## (undated) DEVICE — ELECTRODE BLADE MOD E-Z CLEAN  2.75IN 7CM -0012AM